# Patient Record
Sex: FEMALE | Race: ASIAN | NOT HISPANIC OR LATINO
[De-identification: names, ages, dates, MRNs, and addresses within clinical notes are randomized per-mention and may not be internally consistent; named-entity substitution may affect disease eponyms.]

---

## 2019-04-09 ENCOUNTER — APPOINTMENT (OUTPATIENT)
Dept: ORTHOPEDIC SURGERY | Facility: CLINIC | Age: 24
End: 2019-04-09
Payer: COMMERCIAL

## 2019-04-09 VITALS — BODY MASS INDEX: 36.1 KG/M2 | WEIGHT: 230 LBS | HEIGHT: 67 IN

## 2019-04-09 DIAGNOSIS — Z78.9 OTHER SPECIFIED HEALTH STATUS: ICD-10-CM

## 2019-04-09 DIAGNOSIS — Z56.0 UNEMPLOYMENT, UNSPECIFIED: ICD-10-CM

## 2019-04-09 PROBLEM — Z00.00 ENCOUNTER FOR PREVENTIVE HEALTH EXAMINATION: Status: ACTIVE | Noted: 2019-04-09

## 2019-04-09 PROCEDURE — 73610 X-RAY EXAM OF ANKLE: CPT | Mod: LT

## 2019-04-09 PROCEDURE — 99203 OFFICE O/P NEW LOW 30 MIN: CPT

## 2019-04-09 SDOH — ECONOMIC STABILITY - INCOME SECURITY: UNEMPLOYMENT, UNSPECIFIED: Z56.0

## 2019-04-09 NOTE — HISTORY OF PRESENT ILLNESS
[de-identified] : 23 year old female presents today with left ankle injury x 2 weeks. She fell getting ice-cream and her foot bent under her. She was evaluated by another orthopedist, who diagnosed her with an ankle fx, and placed her in boot and is ambulating via crutches. Rates her pain 2/10 and is taking Aleve for pain. Reports nighttime numbness. Reports continued inability to WB, pain with ROM, and presents for a second opinion. \par \par The patient's past medical history, past surgical history, medications and allergies were reviewed by me today with the patient and documented accordingly. In addition, the patient's family and social history, which were noncontributory to this visit, were reviewed also.

## 2019-04-09 NOTE — DISCUSSION/SUMMARY
[de-identified] : 23-year-old female with left trimalleolar ankle fracture/syndesmosis disruption\par \par Patient has a Tate C. fracture, posterior malleolus injury as well as a comminuted medial malleolus fracture; pronation-ER rotation injury. There is obvious concern for syndesmotic disruption clinically as well as radiographically. Considering the patient's injury pattern, the recommendation is for surgical intervention with surgical reconstruction of the lateral malleolus with likely syndesmotic fixation with possible open reduction internal fixation of both the posterior and medial malleolus. This was discussed in detail with the patient and family members present. Concern for delay in treatment, but would recommend relative urgent fixation at this time. Also would recommend CT imaging to further eval posterior and medial malleoli fx.\par \par Patient was reimmobilized and placed back into a CAM boot nonweightbearing. Recommended ice/elevation prior to surgery. All risks, benefits and alternatives to the proposed surgical procedure, left trimalleolar reduction internal fixation/syndesmotic fixation, as well as the need for formal post-operative rehabilitation were discussed in great detail with the patient. Risks include but are not limited to pain, bleeding, infection, neurovascular injury, stiffness, further surgery for hardware removal, arthrosis, medical complications (including DVT, PE, MI), and risks of anesthesia. \par \par The patient expressed understanding and all questions were answered. The patient is electing to proceed, and will have the patient scheduled accordingly.

## 2019-04-09 NOTE — PHYSICAL EXAM
[de-identified] : Oriented to time, place, person\par Mood: Normal\par Affect: Normal\par Appearance: Healthy, well appearing, no acute distress.\par Gait: Antalgic\par Assistive Devices: Crutches\par \par Left ankle exam\par \par Skin: Clean, dry, intact\par Inspection: No obvious malalignment, significant swelling, moderate effusion\par Pulses: 2+ DP/PT pulses\par ROM: Resting in 20-30degrees plantarflexion, significant pain with dorsiflexion. \par Tenderness: Positive tenderness over the lateral malleolus, no CFL/ATFL/PTFL pain. Positive medial malleolus pain, Positive syndesmosis pain. Pain to the posterior joint.\par Stability: Unstable\par Strength: Intact TA/GS/EHL \par Neuro: In tact to light touch throughout\par  [de-identified] : \par The following radiographs were ordered and read by me during this patients visit. I reviewed each radiograph in detail with the patient and discussed the findings as highlighted below. \par \par 3 views of the left ankle were obtained today that show a trimalleolar ankle fracture with Tate C. fibula fracture, small comminuted posterior/medial malleolus fracture. Mortise appears congruent. Syndesmosis disruption.

## 2019-04-10 ENCOUNTER — APPOINTMENT (OUTPATIENT)
Dept: CT IMAGING | Facility: CLINIC | Age: 24
End: 2019-04-10

## 2019-04-10 ENCOUNTER — OUTPATIENT (OUTPATIENT)
Dept: OUTPATIENT SERVICES | Facility: HOSPITAL | Age: 24
LOS: 1 days | End: 2019-04-10

## 2019-04-10 ENCOUNTER — APPOINTMENT (OUTPATIENT)
Dept: CT IMAGING | Facility: CLINIC | Age: 24
End: 2019-04-10
Payer: COMMERCIAL

## 2019-04-10 ENCOUNTER — OUTPATIENT (OUTPATIENT)
Dept: OUTPATIENT SERVICES | Facility: HOSPITAL | Age: 24
LOS: 1 days | End: 2019-04-10
Payer: COMMERCIAL

## 2019-04-10 VITALS
DIASTOLIC BLOOD PRESSURE: 82 MMHG | HEART RATE: 86 BPM | OXYGEN SATURATION: 99 % | HEIGHT: 66.5 IN | WEIGHT: 225.09 LBS | SYSTOLIC BLOOD PRESSURE: 130 MMHG | TEMPERATURE: 99 F | RESPIRATION RATE: 16 BRPM

## 2019-04-10 DIAGNOSIS — Z00.8 ENCOUNTER FOR OTHER GENERAL EXAMINATION: ICD-10-CM

## 2019-04-10 DIAGNOSIS — S82.852A DISPLACED TRIMALLEOLAR FRACTURE OF LEFT LOWER LEG, INITIAL ENCOUNTER FOR CLOSED FRACTURE: ICD-10-CM

## 2019-04-10 LAB
ANION GAP SERPL CALC-SCNC: 13 MMO/L — SIGNIFICANT CHANGE UP (ref 7–14)
BUN SERPL-MCNC: 13 MG/DL — SIGNIFICANT CHANGE UP (ref 7–23)
CALCIUM SERPL-MCNC: 10.1 MG/DL — SIGNIFICANT CHANGE UP (ref 8.4–10.5)
CHLORIDE SERPL-SCNC: 100 MMOL/L — SIGNIFICANT CHANGE UP (ref 98–107)
CO2 SERPL-SCNC: 24 MMOL/L — SIGNIFICANT CHANGE UP (ref 22–31)
CREAT SERPL-MCNC: 0.66 MG/DL — SIGNIFICANT CHANGE UP (ref 0.5–1.3)
GLUCOSE SERPL-MCNC: 80 MG/DL — SIGNIFICANT CHANGE UP (ref 70–99)
HCG SERPL-ACNC: < 5 MIU/ML — SIGNIFICANT CHANGE UP
HCT VFR BLD CALC: 40.2 % — SIGNIFICANT CHANGE UP (ref 34.5–45)
HGB BLD-MCNC: 12.2 G/DL — SIGNIFICANT CHANGE UP (ref 11.5–15.5)
MCHC RBC-ENTMCNC: 23.2 PG — LOW (ref 27–34)
MCHC RBC-ENTMCNC: 30.3 % — LOW (ref 32–36)
MCV RBC AUTO: 76.4 FL — LOW (ref 80–100)
NRBC # FLD: 0 K/UL — SIGNIFICANT CHANGE UP (ref 0–0)
PLATELET # BLD AUTO: 425 K/UL — HIGH (ref 150–400)
PMV BLD: 10.8 FL — SIGNIFICANT CHANGE UP (ref 7–13)
POTASSIUM SERPL-MCNC: 3.8 MMOL/L — SIGNIFICANT CHANGE UP (ref 3.5–5.3)
POTASSIUM SERPL-SCNC: 3.8 MMOL/L — SIGNIFICANT CHANGE UP (ref 3.5–5.3)
RBC # BLD: 5.26 M/UL — HIGH (ref 3.8–5.2)
RBC # FLD: 15.5 % — HIGH (ref 10.3–14.5)
SODIUM SERPL-SCNC: 137 MMOL/L — SIGNIFICANT CHANGE UP (ref 135–145)
WBC # BLD: 10.01 K/UL — SIGNIFICANT CHANGE UP (ref 3.8–10.5)
WBC # FLD AUTO: 10.01 K/UL — SIGNIFICANT CHANGE UP (ref 3.8–10.5)

## 2019-04-10 PROCEDURE — 76376 3D RENDER W/INTRP POSTPROCES: CPT

## 2019-04-10 PROCEDURE — 73700 CT LOWER EXTREMITY W/O DYE: CPT | Mod: 26,LT

## 2019-04-10 PROCEDURE — 73700 CT LOWER EXTREMITY W/O DYE: CPT

## 2019-04-10 PROCEDURE — 76376 3D RENDER W/INTRP POSTPROCES: CPT | Mod: 26

## 2019-04-10 NOTE — H&P PST ADULT - NEGATIVE ENMT SYMPTOMS
no post-nasal discharge/no throat pain/no dysphagia/no sinus symptoms/no nasal congestion/no hearing difficulty

## 2019-04-10 NOTE — H&P PST ADULT - MUSCULOSKELETAL
details… detailed exam decreased ROM due to pain/left ankle splint in place and hard boot in place, s/p fracture decreased ROM due to pain

## 2019-04-10 NOTE — H&P PST ADULT - NSANTHOSAYNRD_GEN_A_CORE
No. TOBIN screening performed.  STOP BANG Legend: 0-2 = LOW Risk; 3-4 = INTERMEDIATE Risk; 5-8 = HIGH Risk

## 2019-04-10 NOTE — H&P PST ADULT - MUSCULOSKELETAL COMMENTS
hx of fall, left ankle fracture, see HPI left ankle splint and orthopedic boot in place s/p fracture; pt able to wiggle toes, toes warm to touch, good capillary refill

## 2019-04-10 NOTE — H&P PST ADULT - NSICDXPROBLEM_GEN_ALL_CORE_FT
PROBLEM DIAGNOSES  Problem: Displaced trimalleolar fracture of left lower leg, initial encounter for closed fracture  Assessment and Plan: pt scheduled for Left Ankle Trimalleolar Open Reduction Internal Fixation, Syndesmosis Open Reduction Internal Fixation on 04/15/19  Preop instructions provided. Pt verbalized understanding.   Chlorhexidine wash with instructions provided.   [facility]CG for day of OR schedule, specimen container provided PROBLEM DIAGNOSES  Problem: Displaced trimalleolar fracture of left lower leg, initial encounter for closed fracture  Assessment and Plan: pt scheduled for Left Ankle Trimalleolar Open Reduction Internal Fixation, Syndesmosis Open Reduction Internal Fixation on 04/15/19  Preop instructions provided. Pt verbalized understanding.   Chlorhexidine wash with instructions provided.   [facility] for day of OR schedule, specimen container provided  allergy to PCN, OR booking notified

## 2019-04-10 NOTE — H&P PST ADULT - VENOUS THROMBOEMBOLISM CURRENT STATUS
(0) indicator not present/(1) other risk factor (includes escalating BMI, pack-years of smoking, diabetes requiring insulin, chemotherapy, female gender and length of surgery)/(5) hip, pelvis or leg fracture (within 1 month)

## 2019-04-10 NOTE — H&P PST ADULT - HISTORY OF PRESENT ILLNESS
23 y.o. female reports hx of fall in 03/23/19, s/p visit to [facility], s/p xray, diagnosed with left ankle fracture, referred to orthopedist for evaluation, left ankle split in place, presents to PST for evaluation for Left Ankle Trimalleolar Open Reduction Internal Fixation, Syndesmosis Open Reduction Internal Fixation on 04/15/19 23 y.o. female with hx of PCOS, reports hx of fall on 03/23/19, s/p visit to [facility], xray, diagnosed with left ankle fracture, referred to orthopedist for evaluation, left ankle split in place, preop diagnosis displaced trimalleolar fracture of leg, presents to PST for evaluation for Left Ankle Trimalleolar Open Reduction Internal Fixation, Syndesmosis Open Reduction Internal Fixation on 04/15/19

## 2019-04-10 NOTE — H&P PST ADULT - NSICDXPASTMEDICALHX_GEN_ALL_CORE_FT
PAST MEDICAL HISTORY:  Displaced trimalleolar fracture of left lower leg PAST MEDICAL HISTORY:  Displaced trimalleolar fracture of left lower leg     PCOS (polycystic ovarian syndrome)

## 2019-04-10 NOTE — H&P PST ADULT - ASSESSMENT
23 y.o. female with preop diagnosis of displaced trimalleolar fracture of left lower leg, initial encounter for closed fracture

## 2019-04-14 ENCOUNTER — TRANSCRIPTION ENCOUNTER (OUTPATIENT)
Age: 24
End: 2019-04-14

## 2019-04-15 ENCOUNTER — APPOINTMENT (OUTPATIENT)
Dept: ORTHOPEDIC SURGERY | Facility: AMBULATORY SURGERY CENTER | Age: 24
End: 2019-04-15

## 2019-04-15 ENCOUNTER — OUTPATIENT (OUTPATIENT)
Dept: OUTPATIENT SERVICES | Facility: HOSPITAL | Age: 24
LOS: 1 days | Discharge: HOME | End: 2019-04-15
Payer: COMMERCIAL

## 2019-04-15 VITALS
DIASTOLIC BLOOD PRESSURE: 95 MMHG | TEMPERATURE: 98 F | HEART RATE: 102 BPM | HEIGHT: 66.5 IN | SYSTOLIC BLOOD PRESSURE: 125 MMHG | WEIGHT: 225.09 LBS | OXYGEN SATURATION: 99 % | RESPIRATION RATE: 16 BRPM

## 2019-04-15 VITALS
RESPIRATION RATE: 18 BRPM | OXYGEN SATURATION: 97 % | DIASTOLIC BLOOD PRESSURE: 71 MMHG | SYSTOLIC BLOOD PRESSURE: 138 MMHG | TEMPERATURE: 98 F | HEART RATE: 94 BPM

## 2019-04-15 DIAGNOSIS — S82.852A DISPLACED TRIMALLEOLAR FRACTURE OF LEFT LOWER LEG, INITIAL ENCOUNTER FOR CLOSED FRACTURE: ICD-10-CM

## 2019-04-15 PROCEDURE — 27822 TREATMENT OF ANKLE FRACTURE: CPT | Mod: LT

## 2019-04-15 PROCEDURE — 77071 MNL APPL STRS JT RADIOGRAPHY: CPT | Mod: LT

## 2019-04-15 PROCEDURE — 27829 TREAT LOWER LEG JOINT: CPT | Mod: LT

## 2019-04-15 PROCEDURE — 76000 FLUOROSCOPY <1 HR PHYS/QHP: CPT | Mod: 26

## 2019-04-15 RX ORDER — ASPIRIN 325 MG/1
325 TABLET, FILM COATED ORAL DAILY
Qty: 28 | Refills: 0 | Status: ACTIVE | COMMUNITY
Start: 2019-04-15 | End: 1900-01-01

## 2019-04-15 RX ORDER — ASPIRIN 325 MG/1
325 TABLET, FILM COATED ORAL DAILY
Qty: 28 | Refills: 0 | Status: DISCONTINUED | COMMUNITY
Start: 2019-04-15 | End: 2019-04-15

## 2019-04-15 RX ORDER — OXYCODONE AND ACETAMINOPHEN 5; 325 MG/1; MG/1
5-325 TABLET ORAL
Qty: 50 | Refills: 0 | Status: ACTIVE | COMMUNITY
Start: 2019-04-15 | End: 1900-01-01

## 2019-04-15 NOTE — ASU DISCHARGE PLAN (ADULT/PEDIATRIC) - NURSING INSTRUCTIONS
SEE PREPRINTED SHEET. Narcotic pain medicine is constipating , Buy over the counter stool softener and take as instructed on the bottle.

## 2019-04-15 NOTE — ASU DISCHARGE PLAN (ADULT/PEDIATRIC) - CARE PROVIDER_API CALL
Delvis Huston)  Orthopedics  [address]  [place] [address]  Phone: [phone]  Fax: [phone]  Follow Up Time:

## 2019-04-15 NOTE — ASU DISCHARGE PLAN (ADULT/PEDIATRIC) - PAIN MANAGEMENT
MEDICATION REFILL FOR PATIENTS glipizide RECEIVED FROM [facility] VIA phone.  CHART REVIEWED. MEDICATION LAST ISSUED 10-16-17, LAST OFFICE VISIT 10-16-17. FUTURE OFFICE VISIT not scheduled.  MEDICATION REFILL faxed to [facility]. Daughter notified.  
Prescriptions electronically submitted to pharmacy from doctor's office

## 2019-04-15 NOTE — ASU PREOPERATIVE ASSESSMENT, ADULT ([facility] ONLY) - TEACHING/LEARNING LEARNING PREFERENCES
group instruction/individual instruction/skill demonstration/pictorial/verbal instruction/written material

## 2019-04-15 NOTE — ASU DISCHARGE PLAN (ADULT/PEDIATRIC) - CALL YOUR DOCTOR IF YOU HAVE ANY OF THE FOLLOWING:
Bleeding that does not stop/Swelling that gets worse/Fever greater than (need to indicate Fahrenheit or Celsius)/Numbness, tingling, color or temperature change to extremity/Nausea and vomiting that does not stop/Pain not relieved by Medications

## 2019-04-24 ENCOUNTER — TRANSCRIPTION ENCOUNTER (OUTPATIENT)
Age: 24
End: 2019-04-24

## 2019-04-25 ENCOUNTER — APPOINTMENT (OUTPATIENT)
Dept: ORTHOPEDIC SURGERY | Facility: CLINIC | Age: 24
End: 2019-04-25
Payer: COMMERCIAL

## 2019-04-25 PROBLEM — S82.852A DISPLACED TRIMALLEOLAR FRACTURE OF LEFT LOWER LEG, INITIAL ENCOUNTER FOR CLOSED FRACTURE: Chronic | Status: ACTIVE | Noted: 2019-04-10

## 2019-04-25 PROBLEM — E28.2 POLYCYSTIC OVARIAN SYNDROME: Chronic | Status: ACTIVE | Noted: 2019-04-10

## 2019-04-25 PROCEDURE — 73610 X-RAY EXAM OF ANKLE: CPT | Mod: LT

## 2019-04-25 PROCEDURE — 99024 POSTOP FOLLOW-UP VISIT: CPT

## 2019-04-25 NOTE — HISTORY OF PRESENT ILLNESS
[6] : the patient reports pain that is 6/10 in severity [Chills] : no chills [Fever] : no fever [Nausea] : no nausea [Vomiting] : no vomiting [Clean/Dry/Intact] : clean, dry and intact [Healed] : not healed [Erythema] : not erythematous [Discharge] : absent of discharge [Swelling] : swollen [Dehiscence] : not dehisced [Neuro Intact] : an unremarkable neurological exam [Vascular Intact] : ~T peripheral vascular exam normal [Doing Well] : is doing well [Excellent Pain Control] : has excellent pain control [No Sign of Infection] : is showing no signs of infection [Sutures Removed] : sutures were removed [de-identified] : 23 year old female s/p left ankle ORIF, left syndesmosis ORIF 4.15.19 [de-identified] : 23 year old female s/p left ankle ORIF, left syndesmosis ORIF. She states that she is doing well. Her pain is controlled by Oxycodone and Tylenol.  [de-identified] : Left ankle exam\par \par Skin: Incision clean, intact, small blister proximal incision\par Inspection: No malalignment, moderate swelling, no effusion\par Pulses: 2+ DP/PT pulses\par ROM: Limited dorsiflexion\par Tenderness: ttp lateral malleolus\par Stability: Stable \par Strength: Intact TA/GS/EHL\par Neuro: In tact to light touch throughout [de-identified] : \par The following radiographs were ordered and read by me during this patients visit. I reviewed each radiograph in detail with the patient and discussed the findings as highlighted below. \par \par 3 views of the left ankle were obtained today that show anatomic fixation of the ankle mortise, lateral malleolus fixation. No change posterior malleolus/medial malleolus. No posterior subluxation. [de-identified] : 23 year old female s/p left ankle ORIF, left syndesmosis ORIF \par \par Patient presents for a first postoperative visit left ankle ORIF. Discussion is had with the patient regarding intraoperative findings, as well as near anatomic reduction of both the posterior and medial malleolus that did not require further fixation. Patient's ankle mortise is maintained, and patient has syndesmotic fixation that will likely require hardware removal at 3-4 months postoperative.\par \par Recommend: nonweightbearing, followup 2 weeks for incision check, begin PT once the wound heals.\par \par Followup 2 weeks

## 2019-05-13 ENCOUNTER — RX RENEWAL (OUTPATIENT)
Age: 24
End: 2019-05-13

## 2019-05-20 ENCOUNTER — APPOINTMENT (OUTPATIENT)
Dept: ORTHOPEDIC SURGERY | Facility: CLINIC | Age: 24
End: 2019-05-20
Payer: COMMERCIAL

## 2019-05-20 VITALS
DIASTOLIC BLOOD PRESSURE: 80 MMHG | BODY MASS INDEX: 36.1 KG/M2 | WEIGHT: 230 LBS | SYSTOLIC BLOOD PRESSURE: 140 MMHG | HEART RATE: 92 BPM | TEMPERATURE: 98.8 F | HEIGHT: 67 IN

## 2019-05-20 VITALS — BODY MASS INDEX: 36.1 KG/M2 | HEIGHT: 67 IN | WEIGHT: 230 LBS

## 2019-05-20 PROCEDURE — 73610 X-RAY EXAM OF ANKLE: CPT | Mod: LT

## 2019-05-20 PROCEDURE — 99024 POSTOP FOLLOW-UP VISIT: CPT

## 2019-05-20 RX ORDER — CIPROFLOXACIN HYDROCHLORIDE 500 MG/1
500 TABLET, FILM COATED ORAL
Qty: 20 | Refills: 0 | Status: ACTIVE | COMMUNITY
Start: 2019-05-20 | End: 1900-01-01

## 2019-05-21 NOTE — HISTORY OF PRESENT ILLNESS
[4] : the patient reports pain that is 4/10 in severity [Chills] : no chills [Fever] : no fever [Nausea] : no nausea [Vomiting] : no vomiting [Healed] : not healed [Erythema] : erythematous [Discharge] : noted to have a ~M discharge [Swelling] : swollen [Dehiscence] : dehisced [Neuro Intact] : an unremarkable neurological exam [Vascular Intact] : ~T peripheral vascular exam normal [Doing Well] : is doing well [Excellent Pain Control] : has excellent pain control [Signs of Infection] : is showing signs of an infection [de-identified] : 23 year old female s/p left ankle ORIF, left syndesmosis ORIF 4.15.19 [de-identified] : 23 year old female s/p left ankle ORIF, left syndesmosis ORIF. Pain controlled. Not using CAM boot routinely. Reports going to work and having significant swelling of the ankle over the weekend. Yesterday she noticed some wound breakdown and oozing from the wound. Has concerns for infx. No fevers/chills. Denies any wound issues until this weekend.  [de-identified] : Left ankle exam\par \par Skin: Areas of local wound dehisence to the lateral ankle. Vicryl sutures visible. No purulent drainage. Scant serous drainage on dressing. Erythema to incision. \par Inspection: No malalignment, improved swelling, no effusion\par Pulses: 2+ DP/PT pulses\par ROM: Neutral ankle DF, resting 20 deg plantar flexion.\par Tenderness: ttp lateral malleolus / incision\par Stability: Stable \par Strength: Intact TA/GS/EHL\par Neuro: In tact to light touch throughout [de-identified] : \par The following radiographs were ordered and read by me during this patients visit. I reviewed each radiograph in detail with the patient and discussed the findings as highlighted below. \par \par 3 views of the left ankle were obtained today that show anatomic fixation of the ankle mortise, lateral malleolus fixation. No change posterior malleolus/medial malleolus. No posterior subluxation. [de-identified] : 23 year old female s/p left ankle ORIF, left syndesmosis ORIF \par \par Patient reports recent episode of increased swelling to the left lower extremity. Subsequent wound dehiscence and breakdown. No lainey purulence within the wound, and no expressible fluid. Appears superficial at this time. However, concern for wound complications at this stage. Considering current function, recommendation is for antibiotic therapy, and prescription for Cipro provided (PCN allergy). In addition, instructed patient on daily wet-to-dry dressing changes.\par \par Will not begin PT at this time, and will let the soft tissue heel. Would recommend against CAM boot for concern of local irritation. Begin HEP of AROM to the ankle. \par \par Instructed patient that if she becomes febrile or wound worsens, that she should follow up for possible I&D/vac placement.\par \par Recommend: Followup 1 weeks for wound check.\par

## 2019-05-28 ENCOUNTER — APPOINTMENT (OUTPATIENT)
Dept: ORTHOPEDIC SURGERY | Facility: CLINIC | Age: 24
End: 2019-05-28
Payer: COMMERCIAL

## 2019-05-28 PROCEDURE — 99024 POSTOP FOLLOW-UP VISIT: CPT

## 2019-05-29 NOTE — HISTORY OF PRESENT ILLNESS
[Neuro Intact] : an unremarkable neurological exam [Vascular Intact] : ~T peripheral vascular exam normal [Doing Well] : is doing well [Excellent Pain Control] : has excellent pain control [0] : no pain reported [Clean/Dry/Intact] : clean, dry and intact [Dehiscence] : dehisced [No Sign of Infection] : is showing no signs of infection [Chills] : no chills [Fever] : no fever [Nausea] : no nausea [Vomiting] : no vomiting [Erythema] : not erythematous [Healed] : not healed [Discharge] : absent of discharge [Swelling] : not swollen [de-identified] : 23 year old female s/p left ankle ORIF, left syndesmosis ORIF 4.15.19 [de-identified] : 23 year old female s/p left ankle ORIF, left syndesmosis ORIF \par \par Significant improvement in the lateral wound. Evidence of scar formation, without evidence of deep infection. No drainage, no exposed and subcutaneous tissue. \par \par Recommend continued antibiotic therapy, continue wet-to-dry dressing changes. Once there is no drainage, patient may wash the area with soap and water. Given improvements with soft tissue, recommendation at this time is to begin physical therapy to improve range of motion and function.\par \par Recommendations: PT for range of motion. Continued nonweightbearing. May utilize CAM as tolerated versus supportive shoe.\par \par Followup 2 weeks [de-identified] : Left ankle exam\par \par Skin: Healed wound dehisence to the lateral ankle, scab formation. No drainage. No erythema to incision. \par Inspection: No malalignment, minimal swelling, no effusion\par Pulses: 2+ DP/PT pulses\par ROM: Neutral ankle DF, resting 20 deg plantar flexion.\par Tenderness: Minimal throughout\par Stability: Stable \par Strength: 4/5 TA/GS/EHL\par Neuro: In tact to light touch throughout [de-identified] : 23 year old female s/p left ankle ORIF, left syndesmosis ORIF. Pain controlled. Not using CAM boot. Presents for wound check given recent dehisence.  On ABx. No fevers/chills.

## 2019-06-12 ENCOUNTER — APPOINTMENT (OUTPATIENT)
Dept: ORTHOPEDIC SURGERY | Facility: CLINIC | Age: 24
End: 2019-06-12
Payer: COMMERCIAL

## 2019-06-12 VITALS
SYSTOLIC BLOOD PRESSURE: 154 MMHG | WEIGHT: 230 LBS | HEIGHT: 67 IN | DIASTOLIC BLOOD PRESSURE: 90 MMHG | HEART RATE: 114 BPM | BODY MASS INDEX: 36.1 KG/M2

## 2019-06-12 PROCEDURE — 73610 X-RAY EXAM OF ANKLE: CPT | Mod: LT

## 2019-06-12 PROCEDURE — 99024 POSTOP FOLLOW-UP VISIT: CPT

## 2019-06-13 NOTE — HISTORY OF PRESENT ILLNESS
[0] : no pain reported [Clean/Dry/Intact] : clean, dry and intact [Neuro Intact] : an unremarkable neurological exam [Vascular Intact] : ~T peripheral vascular exam normal [Doing Well] : is doing well [Excellent Pain Control] : has excellent pain control [No Sign of Infection] : is showing no signs of infection [Chills] : no chills [Nausea] : no nausea [Fever] : no fever [Vomiting] : no vomiting [Erythema] : not erythematous [Healed] : not healed [Swelling] : not swollen [Discharge] : absent of discharge [de-identified] : 23 year old female s/p left ankle ORIF, left syndesmosis ORIF 4.15.19 [Dehiscence] : not dehisced [de-identified] : 23 year old female s/p left ankle ORIF, left syndesmosis ORIF. Ambulating via crutches. Pain well controlled. Not using CAM boot. No fevers/chills. Improved swelling, no further breakdown of skin. Back to work. Yet to begin PT. [de-identified] : 23 year old female s/p left ankle ORIF, left syndesmosis ORIF \par \par Lateral wound is clean dry and intact at this time. There is small scabs over each Vicryl subcutaneous stitch, and concern for possible Vicryl reaction. Would recommend local skin care with soap and water. Also recommended progression of physical therapy and weightbearing at this time. Patient is approximate 2 months from surgery, and would recommend to begin weightbearing as tolerated in CAM boot. \par \par Recommendation: PT for range of motion. WBAT in CAM. Discontinue crutches as able. Discussed the possible risk of screw fracture.\par \par Followup one month for consideration syndesmosis screw removal [de-identified] : Left ankle exam\par \par Skin: Healed wound dehisence, ? vicryl reaction. No drainage. No erythema.\par Inspection: No swelling, no effusion\par Pulses: 2+ DP/PT pulses\par ROM: 5 deg DF, resting 20 deg plantar flexion.\par Tenderness: Minimal throughout\par Stability: Stable \par Strength: 4+/5 TA/GS/EHL\par Neuro: In tact to light touch throughout [de-identified] : The following radiographs were ordered and read by me during this patients visit. I reviewed each radiograph in detail with the patient and discussed the findings as highlighted below. \par \par 3 views of the left ankle were obtained today that show anatomic fixation of the ankle mortise, lateral malleolus fixation. No change posterior malleolus/medial malleolus. No posterior subluxation.

## 2019-06-18 ENCOUNTER — RX RENEWAL (OUTPATIENT)
Age: 24
End: 2019-06-18

## 2019-07-15 ENCOUNTER — APPOINTMENT (OUTPATIENT)
Dept: ORTHOPEDIC SURGERY | Facility: CLINIC | Age: 24
End: 2019-07-15
Payer: COMMERCIAL

## 2019-07-15 PROCEDURE — 99214 OFFICE O/P EST MOD 30 MIN: CPT

## 2019-07-15 PROCEDURE — 73610 X-RAY EXAM OF ANKLE: CPT | Mod: LT

## 2019-07-16 NOTE — HISTORY OF PRESENT ILLNESS
[0] : no pain reported [Clean/Dry/Intact] : clean, dry and intact [Neuro Intact] : an unremarkable neurological exam [Vascular Intact] : ~T peripheral vascular exam normal [Doing Well] : is doing well [Excellent Pain Control] : has excellent pain control [No Sign of Infection] : is showing no signs of infection [Chills] : no chills [Fever] : no fever [Nausea] : no nausea [Vomiting] : no vomiting [Healed] : not healed [Erythema] : not erythematous [Discharge] : absent of discharge [Swelling] : not swollen [Dehiscence] : not dehisced [de-identified] : 23 year old female s/p left ankle ORIF, left syndesmosis ORIF 4.15.19 [de-identified] : 23 year old female s/p left ankle ORIF, left syndesmosis ORIF. Attending PT 2 x per week. he has been toe touch weightbearing with crutches.She is not taking pain medication.  [de-identified] : Left ankle exam\par \par Skin: Incision C/D/I. No drainage. No erythema.\par Inspection: No swelling, no effusion\par Pulses: 2+ DP/PT pulses\par ROM: 5 deg DF, 20 deg plantar flexion.\par Tenderness: None\par Stability: Stable \par Strength: 4+/5 TA/GS/EHL\par Neuro: In tact to light touch throughout [de-identified] : The following radiographs were ordered and read by me during this patients visit. I reviewed each radiograph in detail with the patient and discussed the findings as highlighted below. \par \par 3 views of the left ankle were obtained today that show healed anatomic fixation of the ankle mortise, lateral malleolus fixation. No change posterior malleolus/medial malleolus. No posterior subluxation.  [de-identified] : 23 year old female s/p left ankle ORIF, left syndesmosis ORIF \par \par Swelling has resolved, patient has returned to good functional range of motion, and is transitioning out of a cam boot. Patient has been tentative with weightbearing, and would like to proceed with syndesmotic screw removal.\par \par All risks, benefits and alternatives to the proposed surgical procedure, left ankle hardware removal, as well as the need for formal post-operative rehabilitation were discussed in great detail with the patient. Risks include but are not limited to pain, bleeding, infection, neurovascular injury, stiffness, refracture, widening of the mortise, medical complications (including DVT, PE, MI), and risks of anesthesia. \par \par The patient expressed understanding and all questions were answered. The patient is electing to proceed, and will have the patient scheduled accordingly.\par

## 2019-07-18 ENCOUNTER — OUTPATIENT (OUTPATIENT)
Dept: OUTPATIENT SERVICES | Facility: HOSPITAL | Age: 24
LOS: 1 days | End: 2019-07-18

## 2019-07-18 ENCOUNTER — TRANSCRIPTION ENCOUNTER (OUTPATIENT)
Age: 24
End: 2019-07-18

## 2019-07-18 VITALS
SYSTOLIC BLOOD PRESSURE: 124 MMHG | DIASTOLIC BLOOD PRESSURE: 80 MMHG | RESPIRATION RATE: 16 BRPM | OXYGEN SATURATION: 98 % | WEIGHT: 218.04 LBS | HEART RATE: 92 BPM | HEIGHT: 65.5 IN | TEMPERATURE: 99 F

## 2019-07-18 DIAGNOSIS — S93.432A SPRAIN OF TIBIOFIBULAR LIGAMENT OF LEFT ANKLE, INITIAL ENCOUNTER: ICD-10-CM

## 2019-07-18 DIAGNOSIS — Z96.7 PRESENCE OF OTHER BONE AND TENDON IMPLANTS: Chronic | ICD-10-CM

## 2019-07-18 DIAGNOSIS — S93.439A SPRAIN OF TIBIOFIBULAR LIGAMENT OF UNSPECIFIED ANKLE, INITIAL ENCOUNTER: ICD-10-CM

## 2019-07-18 LAB
ANION GAP SERPL CALC-SCNC: 12 MMO/L — SIGNIFICANT CHANGE UP (ref 7–14)
BUN SERPL-MCNC: 10 MG/DL — SIGNIFICANT CHANGE UP (ref 7–23)
CALCIUM SERPL-MCNC: 10.2 MG/DL — SIGNIFICANT CHANGE UP (ref 8.4–10.5)
CHLORIDE SERPL-SCNC: 102 MMOL/L — SIGNIFICANT CHANGE UP (ref 98–107)
CO2 SERPL-SCNC: 21 MMOL/L — LOW (ref 22–31)
CREAT SERPL-MCNC: 0.55 MG/DL — SIGNIFICANT CHANGE UP (ref 0.5–1.3)
GLUCOSE SERPL-MCNC: 94 MG/DL — SIGNIFICANT CHANGE UP (ref 70–99)
HCT VFR BLD CALC: 40.5 % — SIGNIFICANT CHANGE UP (ref 34.5–45)
HGB BLD-MCNC: 12.5 G/DL — SIGNIFICANT CHANGE UP (ref 11.5–15.5)
MCHC RBC-ENTMCNC: 23.2 PG — LOW (ref 27–34)
MCHC RBC-ENTMCNC: 30.9 % — LOW (ref 32–36)
MCV RBC AUTO: 75.1 FL — LOW (ref 80–100)
NRBC # FLD: 0 K/UL — SIGNIFICANT CHANGE UP (ref 0–0)
PLATELET # BLD AUTO: 374 K/UL — SIGNIFICANT CHANGE UP (ref 150–400)
PMV BLD: 10.9 FL — SIGNIFICANT CHANGE UP (ref 7–13)
POTASSIUM SERPL-MCNC: 4.2 MMOL/L — SIGNIFICANT CHANGE UP (ref 3.5–5.3)
POTASSIUM SERPL-SCNC: 4.2 MMOL/L — SIGNIFICANT CHANGE UP (ref 3.5–5.3)
RBC # BLD: 5.39 M/UL — HIGH (ref 3.8–5.2)
RBC # FLD: 15.7 % — HIGH (ref 10.3–14.5)
SODIUM SERPL-SCNC: 135 MMOL/L — SIGNIFICANT CHANGE UP (ref 135–145)
WBC # BLD: 8.53 K/UL — SIGNIFICANT CHANGE UP (ref 3.8–10.5)
WBC # FLD AUTO: 8.53 K/UL — SIGNIFICANT CHANGE UP (ref 3.8–10.5)

## 2019-07-18 RX ORDER — ACETAMINOPHEN 500 MG
0 TABLET ORAL
Qty: 0 | Refills: 0 | DISCHARGE

## 2019-07-18 NOTE — H&P PST ADULT - ASSESSMENT
23 y.o. female with preop diagnosis of sprain of tibiofibular ligament of left ankle, initial encounter

## 2019-07-18 NOTE — H&P PST ADULT - NEGATIVE GENERAL GENITOURINARY SYMPTOMS
no flank pain L/no incontinence/no flank pain R/no bladder infections/no hematuria/no renal colic/no dysuria

## 2019-07-18 NOTE — H&P PST ADULT - VISION (WITH CORRECTIVE LENSES IF THE PATIENT USUALLY WEARS THEM):
Normal vision: sees adequately in most situations; can see medication labels, newsprint/Reading and distance

## 2019-07-18 NOTE — H&P PST ADULT - NSICDXPROBLEM_GEN_ALL_CORE_FT
PROBLEM DIAGNOSES  Problem: Sprain of tibiofibular ligament of ankle  Assessment and Plan: pt scheduled for Left Ankle Removal of Implant Deep on 07/19/19  Preop instructions provided. Pt verbalized understanding.   Pepcid for GI prophylaxis with written and verbal instruction provided    written and verbal instructions with teach back on chlorhexidine shampoo provided,  pt verbalized understanding with returned demonstration  PCN allergy, OR booking notified PROBLEM DIAGNOSES  Problem: Sprain of tibiofibular ligament of ankle  Assessment and Plan: pt scheduled for Left Ankle Removal of Implant Deep on 07/19/19  Preop instructions provided. Pt verbalized understanding.   Pepcid for GI prophylaxis with written and verbal instruction provided    written and verbal instructions with teach back on chlorhexidine shampoo provided,  pt verbalized understanding with returned demonstration  PCN allergy, OR booking notified  UCG for day of OR odered

## 2019-07-18 NOTE — H&P PST ADULT - NEGATIVE ENMT SYMPTOMS
no sinus symptoms/no post-nasal discharge/no hearing difficulty/no nasal congestion/no throat pain/no dysphagia

## 2019-07-18 NOTE — H&P PST ADULT - NSICDXPASTMEDICALHX_GEN_ALL_CORE_FT
PAST MEDICAL HISTORY:  Displaced trimalleolar fracture of left lower leg     PCOS (polycystic ovarian syndrome)

## 2019-07-18 NOTE — H&P PST ADULT - NEGATIVE MUSCULOSKELETAL SYMPTOMS
no arthritis/no back pain/no muscle cramps/no neck pain/no joint swelling/no muscle weakness/no myalgia/no stiffness

## 2019-07-18 NOTE — H&P PST ADULT - MUSCULOSKELETAL COMMENTS
hx of left ankle fracture, s/p ORIF, see HPI left foot non weight bearing, ambulates with crutches left foot non weight bearing, ambulates with crutches; well healed surgical incision left lateral lower extremity

## 2019-07-18 NOTE — H&P PST ADULT - HISTORY OF PRESENT ILLNESS
23 y.o. female with hx of PCOS, hx of left ankle fracture, s/p ORIF in 04/15/19, pt reports good healing of the fracture, using crutches, presents to PST for evaluation for Left Ankle Removal of Implant Deep on 07/19/19 23 y.o. female with hx of PCOS, hx of fall and left ankle fracture, s/p ORIF in 04/15/19, pt reports good healing of the fracture, denies pain, tenderness, using crutches, presents to PST for evaluation for Left Ankle Removal of Implant Deep on 07/19/19

## 2019-07-19 ENCOUNTER — APPOINTMENT (OUTPATIENT)
Dept: ORTHOPEDIC SURGERY | Facility: AMBULATORY SURGERY CENTER | Age: 24
End: 2019-07-19

## 2019-07-19 ENCOUNTER — RX RENEWAL (OUTPATIENT)
Age: 24
End: 2019-07-19

## 2019-07-19 ENCOUNTER — OUTPATIENT (OUTPATIENT)
Dept: OUTPATIENT SERVICES | Facility: HOSPITAL | Age: 24
LOS: 1 days | Discharge: HOME | End: 2019-07-19
Payer: COMMERCIAL

## 2019-07-19 VITALS
SYSTOLIC BLOOD PRESSURE: 108 MMHG | TEMPERATURE: 98 F | RESPIRATION RATE: 20 BRPM | DIASTOLIC BLOOD PRESSURE: 65 MMHG | OXYGEN SATURATION: 99 % | HEART RATE: 75 BPM

## 2019-07-19 VITALS
OXYGEN SATURATION: 99 % | TEMPERATURE: 98 F | WEIGHT: 218.04 LBS | DIASTOLIC BLOOD PRESSURE: 84 MMHG | RESPIRATION RATE: 16 BRPM | SYSTOLIC BLOOD PRESSURE: 130 MMHG | HEIGHT: 65.5 IN | HEART RATE: 81 BPM

## 2019-07-19 DIAGNOSIS — Z96.7 PRESENCE OF OTHER BONE AND TENDON IMPLANTS: Chronic | ICD-10-CM

## 2019-07-19 DIAGNOSIS — S93.432A SPRAIN OF TIBIOFIBULAR LIGAMENT OF LEFT ANKLE, INITIAL ENCOUNTER: ICD-10-CM

## 2019-07-19 PROCEDURE — 20680 REMOVAL OF IMPLANT DEEP: CPT | Mod: LT

## 2019-07-19 RX ORDER — OXYCODONE AND ACETAMINOPHEN 5; 325 MG/1; MG/1
5-325 TABLET ORAL
Qty: 30 | Refills: 0 | Status: ACTIVE | COMMUNITY
Start: 2019-07-19 | End: 1900-01-01

## 2019-07-19 NOTE — ASU PREOP CHECKLIST - TYPE OF SOLUTION
Pre procedure instructions reviewed with patient. Instructed to bring a current list of medications. Instructed to only take inhalers morning of procedures. Aware of clear liquid diet all day on 7/2/18 and to monitor blood sugars. Given MD office number if any issues should arise. Aware of anesthesia for procedure on 7/3/18 at [facility] with arrival time 1000. Aware that she needs a  home or someone to accompany her in a cab.  Verbalized understanding.   IVLR @ 30ml/hr

## 2019-07-19 NOTE — ASU DISCHARGE PLAN (ADULT/PEDIATRIC) - PAIN MANAGEMENT
Prescriptions electronically submitted to pharmacy from [facility] you may take at 7 pm/Prescriptions electronically submitted to pharmacy from [facility]

## 2019-07-19 NOTE — ASU DISCHARGE PLAN (ADULT/PEDIATRIC) - CALL YOUR DOCTOR IF YOU HAVE ANY OF THE FOLLOWING:
see sheet Inability to tolerate liquids or foods/Wound/Surgical Site with redness, or foul smelling discharge or pus/Numbness, tingling, color or temperature change to extremity/see sheet/Unable to urinate/Nausea and vomiting that does not stop/Bleeding that does not stop/Pain not relieved by Medications

## 2019-07-19 NOTE — ASU DISCHARGE PLAN (ADULT/PEDIATRIC) - CARE PROVIDER_API CALL
Delvis Huston)  Orthopedics  [address] [facility] [address]  [place] [address]  Phone: [phone]  Fax: [phone]  Follow Up Time:

## 2019-07-31 ENCOUNTER — APPOINTMENT (OUTPATIENT)
Dept: ORTHOPEDIC SURGERY | Facility: CLINIC | Age: 24
End: 2019-07-31
Payer: COMMERCIAL

## 2019-07-31 VITALS
DIASTOLIC BLOOD PRESSURE: 93 MMHG | WEIGHT: 230 LBS | SYSTOLIC BLOOD PRESSURE: 156 MMHG | HEIGHT: 67 IN | BODY MASS INDEX: 36.1 KG/M2 | HEART RATE: 110 BPM

## 2019-07-31 PROCEDURE — 73610 X-RAY EXAM OF ANKLE: CPT | Mod: LT

## 2019-07-31 PROCEDURE — 99024 POSTOP FOLLOW-UP VISIT: CPT

## 2019-08-01 NOTE — HISTORY OF PRESENT ILLNESS
[0] : no pain reported [Clean/Dry/Intact] : clean, dry and intact [Neuro Intact] : an unremarkable neurological exam [Vascular Intact] : ~T peripheral vascular exam normal [Doing Well] : is doing well [Excellent Pain Control] : has excellent pain control [No Sign of Infection] : is showing no signs of infection [Chills] : no chills [Fever] : no fever [Nausea] : no nausea [Vomiting] : no vomiting [Healed] : not healed [Erythema] : not erythematous [Discharge] : absent of discharge [Swelling] : not swollen [Dehiscence] : not dehisced [Sutures Removed] : sutures were removed [Steri-Strips Removed & Replaced] : steri-strips removed and replaced [de-identified] : 23 year old female s/p left ankle ORIF, left syndesmosis ORIF 4.15.19, left ankle SILVIA 7.19.19\par  [de-identified] : 23 year old female s/p left ankle ORIF, left syndesmosis ORIF 4.15.19, left ankle SILVIA 7.19.19. She is not taking pain medication. Denies post op complication. Feels better following SILVIA. Able to place some weight on ankle.  [de-identified] : Left ankle exam\par \par Skin: Incision C/D/I. No drainage. No erythema.\par Inspection: No swelling, no effusion\par Pulses: 2+ DP/PT pulses\par ROM: 5 deg DF, 20 deg plantar flexion.\par Tenderness: None\par Stability: Stable \par Strength: 4+/5 TA/GS/EHL\par Neuro: In tact to light touch throughout [de-identified] : The following radiographs were ordered and read by me during this patients visit. I reviewed each radiograph in detail with the patient and discussed the findings as highlighted below. \par \par 3 views of the left ankle were obtained today that show healed anatomic fixation of the ankle mortise, lateral malleolus fixation. No change posterior malleolus/medial malleolus. No posterior subluxation. Evidence of syndesmotic screw removal, anatomic restoration of syndesmosis.  [de-identified] : 23 year old female s/p left ankle ORIF, left syndesmosis ORIF 4.15.19, left ankle SILVIA 7.19.19\par \par Patient reports significant improvement in pain or function to the left ankle following hardware removal of the syndesmosis. The syndesmosis appears now anatomic, and no longer overconstrained. Would recommend weightbearing as tolerated in regular shoe wear at this time. We'll continue to avoid impact loading activity at this time, but will gradually maintain strength and function over the ensuing 6-8 weeks.\par \par Continue PT.\par \par Followup 6 weeks

## 2019-09-30 ENCOUNTER — APPOINTMENT (OUTPATIENT)
Dept: ORTHOPEDIC SURGERY | Facility: CLINIC | Age: 24
End: 2019-09-30
Payer: COMMERCIAL

## 2019-09-30 DIAGNOSIS — T81.31XD DISRUPTION OF EXTERNAL OPERATION (SURGICAL) WOUND, NOT ELSEWHERE CLASSIFIED, SUBSEQUENT ENCOUNTER: ICD-10-CM

## 2019-09-30 PROCEDURE — 99024 POSTOP FOLLOW-UP VISIT: CPT

## 2019-09-30 PROCEDURE — 73610 X-RAY EXAM OF ANKLE: CPT | Mod: LT

## 2019-10-01 PROBLEM — T81.31XD POSTOPERATIVE WOUND DEHISCENCE, SUBSEQUENT ENCOUNTER: Status: RESOLVED | Noted: 2019-05-21 | Resolved: 2019-10-01

## 2019-10-01 NOTE — ADDENDUM
[FreeTextEntry1] : This note was written by Valorie Peterson on 09/30/2019 acting solely as a scribe for Dr. Delvis Huston.\par \par All medical record entries made by the Scribe were at my, Dr. Delvis Huston, direction and personally dictated by me on 09/30/2019. I have personally reviewed the chart and agree that the record accurately reflects my personal performance of the history, physical exam, assessment and plan.\par

## 2019-10-01 NOTE — HISTORY OF PRESENT ILLNESS
[0] : no pain reported [Clean/Dry/Intact] : clean, dry and intact [Neuro Intact] : an unremarkable neurological exam [Vascular Intact] : ~T peripheral vascular exam normal [Doing Well] : is doing well [Excellent Pain Control] : has excellent pain control [No Sign of Infection] : is showing no signs of infection [Chills] : no chills [Fever] : no fever [Nausea] : no nausea [Vomiting] : no vomiting [Healed] : not healed [Erythema] : not erythematous [Discharge] : absent of discharge [Swelling] : not swollen [Dehiscence] : not dehisced [de-identified] : 23 year old female s/p left ankle ORIF, left syndesmosis ORIF 4.15.19, left ankle SILVIA 7.19.19\par  [de-identified] : 23 year old female s/p left ankle ORIF, left syndesmosis ORIF 4.15.19, left ankle SILVIA 7.19.19. She is not taking pain medication. Denies post op complication. Feels better following SILVIA. Able to place weight on ankle, still with mild antalgia, but reports minimal to no pain.  [de-identified] : Left ankle exam  Skin: Incision C/D/I. No drainage. No erythema. Inspection: No swelling, no effusion Pulses: 2+ DP/PT pulses ROM: 10 deg DF, 25 deg plantar flexion. Tenderness: None Stability: Stable  Strength: 4+/5 TA/GS/EHL Neuro: In tact to light touch throughout [de-identified] : The following radiographs were ordered and read by me during this patients visit. I reviewed each radiograph in detail with the patient and discussed the findings as highlighted below. \par \par 3 views of the left ankle were obtained today, 9/30/19 that show healed anatomic fixation of the ankle mortise, lateral malleolus fixation. No change posterior malleolus/medial malleolus. No posterior subluxation. Evidence of syndesmotic screw removal, anatomic restoration of syndesmosis.  [de-identified] : 23 year old female s/p left ankle ORIF, left syndesmosis ORIF 4.15.19, left ankle SILVIA 7.19.19\par \par Patient reports significant improvement in pain or function to the left ankle following hardware removal of the syndesmosis. The syndesmosis appears now anatomic, and no longer overconstrained. The patient does report that her leg externally rotates while she walks, but is tentative with gradual return to activity.\par \par No significant discomfort at this time, retained hardware, but good anatomic reduction of both the ankle mortise as well as syndesmosis at this time. \par \par Recommendation: Continue home exercise program, ADLs as tolerated. Regular shoe wear.\par \par Followup as needed

## 2020-02-19 NOTE — H&P PST ADULT - PRIMARY CARE PROVIDER
Patient transferred from ED into PACU bed 23 via cart with 2 belonging bags and purse.    Dr. Sharp, [place] [phone]

## 2021-09-22 NOTE — H&P PST ADULT - VENOUS THROMBOEMBOLISM
"Pt aox4, ABCs intact. Pt c/o cough, congestion, chest pain, \"gasey feeling\", abdominal pain, and nausea/vomiting x3 weeks. 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 Pt states that she overall feels unwell.  " no

## 2022-06-30 ENCOUNTER — APPOINTMENT (OUTPATIENT)
Dept: ORTHOPEDIC SURGERY | Facility: CLINIC | Age: 27
End: 2022-06-30

## 2022-06-30 DIAGNOSIS — S82.852D DISPLACED TRIMALLEOLAR FRACTURE OF LEFT LOWER LEG, SUBSEQUENT ENCOUNTER FOR CLOSED FRACTURE WITH ROUTINE HEALING: ICD-10-CM

## 2022-06-30 DIAGNOSIS — S93.432D SPRAIN OF TIBIOFIBULAR LIGAMENT OF LEFT ANKLE, SUBSEQUENT ENCOUNTER: ICD-10-CM

## 2022-06-30 PROCEDURE — 73610 X-RAY EXAM OF ANKLE: CPT | Mod: LT

## 2022-06-30 PROCEDURE — 99213 OFFICE O/P EST LOW 20 MIN: CPT

## 2022-07-25 PROBLEM — S82.852D CLOSED TRIMALLEOLAR FRACTURE OF LEFT ANKLE WITH ROUTINE HEALING, SUBSEQUENT ENCOUNTER: Status: ACTIVE | Noted: 2019-04-09

## 2022-07-25 PROBLEM — S93.432D SYNDESMOTIC DISRUPTION OF LEFT ANKLE, SUBSEQUENT ENCOUNTER: Status: ACTIVE | Noted: 2019-04-09

## 2022-07-25 NOTE — DISCUSSION/SUMMARY
[de-identified] : 26 year old female s/p left ankle ORIF, left syndesmosis ORIF 4.15.19, left ankle SILVIA 7.19.19\par \par Patient presents for evaluation of the left ankle status post ORIF and hardware removal. She is doing very well and is returned to high-impact exercise program.  She has minimal complaints of residual swelling with increased exercise, and we discussed that this can be normal given her injury patten. She is otherwise very happy with her post-operative results\par \par Recommendation: NSAIDs/Ice prn.  Activity to tolerance.\par \par Followup as needed.

## 2022-07-25 NOTE — ADDENDUM
[FreeTextEntry1] : This note was written by Char Kevin on 06/30/2022 acting solely as a scribe for Dr. Delvis Huston.\par \par All medical record entries made by the Scribe were at my, Dr. Delvis Huston, direction and personally dictated by me on 06/30/2022. I have personally reviewed the chart and agree that the record accurately reflects my personal performance of the history, physical exam, assessment and plan.

## 2022-07-25 NOTE — HISTORY OF PRESENT ILLNESS
[de-identified] : 26 year old female s/p left ankle ORIF, left syndesmosis ORIF 4.15.19, left ankle SILVIA 7.19.19 presents today for check up. States that she has swelling in her ankle with prolonged walking. Pain is present with cold weather and walking fast. Denies numbness or tingling.  Otherwise, patient has returned to full activity.

## 2022-07-25 NOTE — PHYSICAL EXAM
[de-identified] : Oriented to time, place, person\par Mood: Normal\par Affect: Normal\par Appearance: Healthy, well appearing, no acute distress.\par Gait: Normal\par Assistive Devices: None\par \par Left Ankle exam:\par \par Skin: Incisions clean, dry, intact\par Inspection: No obvious malalignment, no swelling, no effusion\par Pulses: 2+ DP/PT pulses \par ROM: 15 degrees of dorsiflexion, 30 degrees of plantarflexion, normal subtalar motion.\par Tenderness: No tenderness over the lateral malleolus, no CFL/ATFL/PTFL pain. No medial malleolus pain, no deltoid ligament pain. No proximal fibular pain. No heel pain.\par Stability: Negative anterior drawer, negative posterior drawer.\par Strength: 5/5 TA/GS/EHL 5/5 inversion/eversion\par Neuro: In tact to light touch throughout\par Additional tests: Negative syndesmosis squeeze test.  [de-identified] : The following radiographs were ordered and read by me during this patients visit. I reviewed each radiograph in detail with the patient and discussed the findings as highlighted below. \par \par 3 views of the left ankle were obtained today, 6/30/22 that show healed anatomic fixation of the ankle mortise, lateral malleolus fixation. No change posterior malleolus/medial malleolus.

## 2024-01-31 NOTE — H&P PST ADULT - NS PRO LACT YNNA
SURVEY:    You may be receiving a survey from [employer] regarding your visit today.    Please complete the survey to enable us to provide the highest quality of care to you and your family.    If you cannot score us a very good on any question, please call the office to discuss how we could have made your experience a very good one.    Thank you.     no

## 2024-12-10 NOTE — ASU PREOP CHECKLIST - ADVANCE DIRECTIVE ADDRESSED/READDRESSED
done/defer to mother [Dryness] : dryness [Vision Problems] : vision problems [Hearing Loss] : hearing loss [Negative] : Psychiatric [Redness] : no redness [Itching] : no itching [Earache] : no earache [Nasal Discharge] : no nasal discharge [Sore Throat] : no sore throat [Hoarseness] : no hoarseness